# Patient Record
Sex: FEMALE | Race: WHITE
[De-identification: names, ages, dates, MRNs, and addresses within clinical notes are randomized per-mention and may not be internally consistent; named-entity substitution may affect disease eponyms.]

---

## 2019-02-23 ENCOUNTER — HOSPITAL ENCOUNTER (EMERGENCY)
Dept: HOSPITAL 11 - JP.ED | Age: 44
LOS: 1 days | Discharge: HOME | End: 2019-02-24
Payer: COMMERCIAL

## 2019-02-23 DIAGNOSIS — I10: ICD-10-CM

## 2019-02-23 DIAGNOSIS — Z88.8: ICD-10-CM

## 2019-02-23 DIAGNOSIS — Z90.710: ICD-10-CM

## 2019-02-23 DIAGNOSIS — Z90.49: ICD-10-CM

## 2019-02-23 DIAGNOSIS — R10.13: Primary | ICD-10-CM

## 2019-02-23 DIAGNOSIS — Z79.899: ICD-10-CM

## 2019-02-23 PROCEDURE — 83690 ASSAY OF LIPASE: CPT

## 2019-02-23 PROCEDURE — 84484 ASSAY OF TROPONIN QUANT: CPT

## 2019-02-23 PROCEDURE — 80053 COMPREHEN METABOLIC PANEL: CPT

## 2019-02-23 PROCEDURE — 36415 COLL VENOUS BLD VENIPUNCTURE: CPT

## 2019-02-23 PROCEDURE — C9113 INJ PANTOPRAZOLE SODIUM, VIA: HCPCS

## 2019-02-23 PROCEDURE — 82553 CREATINE MB FRACTION: CPT

## 2019-02-23 PROCEDURE — 85025 COMPLETE CBC W/AUTO DIFF WBC: CPT

## 2019-02-23 PROCEDURE — 96361 HYDRATE IV INFUSION ADD-ON: CPT

## 2019-02-23 PROCEDURE — 71045 X-RAY EXAM CHEST 1 VIEW: CPT

## 2019-02-23 PROCEDURE — G0480 DRUG TEST DEF 1-7 CLASSES: HCPCS

## 2019-02-23 PROCEDURE — 99284 EMERGENCY DEPT VISIT MOD MDM: CPT

## 2019-02-23 PROCEDURE — 96374 THER/PROPH/DIAG INJ IV PUSH: CPT

## 2019-02-23 PROCEDURE — 96375 TX/PRO/DX INJ NEW DRUG ADDON: CPT

## 2019-02-23 NOTE — CRLCR
INDICATION:



Chest pain.



TECHNIQUE:



AP portable chest x-ray.



FINDINGS:



The heart size is upper limits of normal. Shallow inspiration. No focal 

infiltrate or consolidation either lung. Minimal elevation right 

hemidiaphragm. Surgical clips right upper abdomen. Mild ectasia ascending 

aorta and aortic arch. Chest otherwise negative without acute disease.



Dictated by Hany Leahy MD @ Feb 23 2019  9:39PM



Signed by Dr. Hany Leahy @ Feb 23 2019  9:39PM

## 2019-02-23 NOTE — EDM.PDOC
ED HPI GENERAL MEDICAL PROBLEM





- General


Chief Complaint: Chest Pain


Stated Complaint: MEDICAL VIA NORTH


Time Seen by Provider: 19 20:26


Source of Information: Reports: Patient, Family, RN Notes Reviewed


History Limitations: Reports: No Limitations





- History of Present Illness


INITIAL COMMENTS - FREE TEXT/NARRATIVE: 





43-year-old female presents emergency department today via EMS services for 

sudden onset epigastric pain, she states she's never had pain like this before 

it is right the center of her chest sharp shooting through to the back she 

states she was so waiting for dinner prior to the onset of pain, has consumed a 

large amount alcohol today, surgical history of cholecystectomy and appendectomy

, cardiovascular history of hypertension, no nausea vomiting no diaphoresis no 

shortness of breath


  ** epigastric pain


Pain Score (Numeric/FACES): 4





- Related Data


 Allergies











Allergy/AdvReac Type Severity Reaction Status Date / Time


 


doxycycline Allergy  Rash Verified 19 20:16


 


nickel Allergy  Rash Verified 19 20:16











Home Meds: 


 Home Meds





Diltiazem HCl [Cartia Xt] 240 mg PO DAILY 19 [History]


Lisinopril/Hydrochlorothiazide [Lisinopril-Hctz 10-12.5 mg Tab] 1 tab PO DAILY 

19 [History]


Omeprazole Magnesium [Prilosec Otc] 20 mg PO DAILY 19 [History]











Past Medical History


Cardiovascular History: Reports: Arrhythmia, Hypertension


Gastrointestinal History: Reports: Other (See Below)


Other Gastrointestinal History: acid reflux


OB/GYN History: Reports: Pregnancy


Musculoskeletal History: Reports: Arthritis, Fracture, Other (See Below)


Other Musculoskeletal History: left foot fx





- Past Surgical History


GI Surgical History: Reports: Appendectomy, Cholecystectomy


Female  Surgical History: Reports:  Section, Hysterectomy


Neurological Surgical History: Reports: Other (See Below)


Other Neurological Surgeries/Procedures: L5-S1 microdiscectomy





Social & Family History





- Tobacco Use


Smoking Status *Q: Never Smoker





- Caffeine Use


Caffeine Use: Reports: Energy Drinks, Soda





- Recreational Drug Use


Recreational Drug Use: No





ED ROS GENERAL





- Review of Systems


Review Of Systems: See Below


Constitutional: Reports: No Symptoms


HEENT: Reports: No Symptoms


Respiratory: Reports: No Symptoms


Cardiovascular: Reports: No Symptoms


GI/Abdominal: Reports: Abdominal Pain (Epigastric pain), Flatus.  Denies: Nausea

, Vomiting


: Reports: No Symptoms


Musculoskeletal: Reports: No Symptoms


Skin: Reports: No Symptoms


Neurological: Reports: No Symptoms





ED EXAM, GENERAL





- Physical Exam


Exam: See Below


Free Text/Narrative:: 





General: Female, moderate discomfort secondary to pain, alert and oriented x3 

HEENT: head is atraumatic normocephalic, eyes pupils equal round reactive to 

light, sclera clear no conjunctivitis appreciated.  Ears tympanic membranes 

clear and gray landmarks and light reflex are present bilaterally canals are 

clear.  Nose no septal deviation, nares are clear, no blood present.  Mouth 

mucosa is moist and pink no erythema or exudate noted in soft palate, tongue is 

midline uvula is midline, dentition is intact.


Neck: Supple no thyromegaly no tracheal deviation.


Nodes: Cervical nodes subclavicular nodes nontender no palpable lymphadenopathy 

noted.


Lungs: clear to auscultation bilaterally with symmetrical respirations, no 

adventitious noise appreciated.


CV: Regular rate and rhythm S1 and S2 appreciated no murmurs rubs or gallops 

noted.


Abdomen: Soft, tender in epigastric region pain is improved with pressure, no 

palpable masses or organomegaly appreciated, no distention no guarding bowel 

sounds are present, 


Neuro: Cranial nerves II through XII intact GCS 15,


Skin: Warm and dry, intact


Extremities: No lower extremity edema appreciated, pedal pulse is +2.





Course





- Vital Signs


Last Recorded V/S: 


 Last Vital Signs











Temp  97.2 F   19 20:27


 


Pulse  81   19 23:00


 


Resp  17   19 23:00


 


BP  109/69   19 23:00


 


Pulse Ox  98   19 23:00














- Orders/Labs/Meds


Orders: 


 Active Orders 24 hr











 Category Date Time Status


 


 Cardiac Monitoring [RC] .As Directed Care  19 20:30 Active


 


 Sodium Chloride 0.9% [Normal Saline] 1,000 ml Med  19 20:45 Active





 IV ASDIRECTED   


 


 Sodium Chloride 0.9% [Normal Saline] 1,000 ml Med  19 22:15 Active





 IV ASDIRECTED   


 


 Sodium Chloride 0.9% [Saline Flush] Med  19 20:30 Active





 10 ml FLUSH ASDIRECTED PRN   


 


 Saline Lock Insert [OM.PC] Stat Oth  19 20:30 Ordered








 Medication Orders





Sodium Chloride (Normal Saline)  1,000 mls @ 999 mls/hr IV ASDIRECTED CESAR


   Last Admin: 19 20:44  Dose: 999 mls/hr


Sodium Chloride (Normal Saline)  1,000 mls @ 500 mls/hr IV ASDIRECTED CESAR


   Last Admin: 19 22:28  Dose: 500 mls/hr


Sodium Chloride (Saline Flush)  10 ml FLUSH ASDIRECTED PRN


   PRN Reason: Keep Vein Open


   Last Admin: 19 20:46  Dose: 10 ml








Labs: 


 Laboratory Tests











  19 Range/Units





  20:42 20:42 20:42 


 


WBC  8.8    (4.5-11.0)  K/uL


 


RBC  4.40    (3.30-5.50)  M/uL


 


Hgb  13.5    (12.0-15.0)  g/dL


 


Hct  39.5    (36.0-48.0)  %


 


MCV  90    (80-98)  fL


 


MCH  31    (27-31)  pg


 


MCHC  34    (32-36)  %


 


Plt Count  339    (150-400)  K/uL


 


Neut % (Auto)  53    (36-66)  %


 


Lymph % (Auto)  37    (24-44)  %


 


Mono % (Auto)  9 H    (2-6)  %


 


Eos % (Auto)  1 L    (2-4)  %


 


Baso % (Auto)  0    (0-1)  %


 


Sodium   142   (140-148)  mmol/L


 


Potassium   3.1 L   (3.6-5.2)  mmol/L


 


Chloride   102   (100-108)  mmol/L


 


Carbon Dioxide   27   (21-32)  mmol/L


 


Anion Gap   16.1 H   (5.0-14.0)  mmol/L


 


BUN   9   (7-18)  mg/dL


 


Creatinine   0.9   (0.6-1.0)  mg/dL


 


Est Cr Clr Drug Dosing   68.16   mL/min


 


Estimated GFR (MDRD)   > 60   (>60)  


 


Glucose   138 H   ()  mg/dL


 


Calcium   9.2   (8.5-10.1)  mg/dL


 


Total Bilirubin   0.3   (0.2-1.0)  mg/dL


 


AST   63 H   (15-37)  U/L


 


ALT   57   (12-78)  U/L


 


Alkaline Phosphatase   88   ()  U/L


 


CK-MB (CK-2)   1.7   (0-3.6)  mg/mL


 


Troponin I   < 0.017   (0.000-0.056)  ng/mL


 


Total Protein   7.4   (6.4-8.2)  g/dL


 


Albumin   3.6   (3.4-5.0)  g/dL


 


Globulin   3.8 H   (2.3-3.5)  g/dL


 


Albumin/Globulin Ratio   1.0 L   (1.2-2.2)  


 


Lipase   329   ()  U/L


 


Ethyl Alcohol    216  mg/dL














  19 Range/Units





  22:28 


 


WBC   (4.5-11.0)  K/uL


 


RBC   (3.30-5.50)  M/uL


 


Hgb   (12.0-15.0)  g/dL


 


Hct   (36.0-48.0)  %


 


MCV   (80-98)  fL


 


MCH   (27-31)  pg


 


MCHC   (32-36)  %


 


Plt Count   (150-400)  K/uL


 


Neut % (Auto)   (36-66)  %


 


Lymph % (Auto)   (24-44)  %


 


Mono % (Auto)   (2-6)  %


 


Eos % (Auto)   (2-4)  %


 


Baso % (Auto)   (0-1)  %


 


Sodium   (140-148)  mmol/L


 


Potassium   (3.6-5.2)  mmol/L


 


Chloride   (100-108)  mmol/L


 


Carbon Dioxide   (21-32)  mmol/L


 


Anion Gap   (5.0-14.0)  mmol/L


 


BUN   (7-18)  mg/dL


 


Creatinine   (0.6-1.0)  mg/dL


 


Est Cr Clr Drug Dosing   mL/min


 


Estimated GFR (MDRD)   (>60)  


 


Glucose   ()  mg/dL


 


Calcium   (8.5-10.1)  mg/dL


 


Total Bilirubin   (0.2-1.0)  mg/dL


 


AST   (15-37)  U/L


 


ALT   (12-78)  U/L


 


Alkaline Phosphatase   ()  U/L


 


CK-MB (CK-2)   (0-3.6)  mg/mL


 


Troponin I  < 0.017  (0.000-0.056)  ng/mL


 


Total Protein   (6.4-8.2)  g/dL


 


Albumin   (3.4-5.0)  g/dL


 


Globulin   (2.3-3.5)  g/dL


 


Albumin/Globulin Ratio   (1.2-2.2)  


 


Lipase   ()  U/L


 


Ethyl Alcohol   mg/dL











Meds: 


Medications











Generic Name Dose Route Start Last Admin





  Trade Name Freq  PRN Reason Stop Dose Admin


 


Sodium Chloride  1,000 mls @ 999 mls/hr  19 20:45  19 20:44





  Normal Saline  IV   999 mls/hr





  ASDIRECTED CESAR   Administration





     





     





     





     


 


Sodium Chloride  1,000 mls @ 500 mls/hr  19 22:15  19 22:28





  Normal Saline  IV   500 mls/hr





  ASDIRECTED CESAR   Administration





     





     





     





     


 


Sodium Chloride  10 ml  19 20:30  19 20:46





  Saline Flush  FLUSH   10 ml





  ASDIRECTED PRN   Administration





  Keep Vein Open   





     





     





     














Discontinued Medications














Generic Name Dose Route Start Last Admin





  Trade Name Freq  PRN Reason Stop Dose Admin


 


Al Hydroxide/Mg Hydroxide 15  0 ml  19 20:37  19 20:44





  ml/ Lidocaine HCl 15 ml  PO  19 20:38  30 ml





  ONETIME ONE   Administration





     





     





     





     


 


Lorazepam  1 mg  19 20:59  19 21:08





  Ativan  IVPUSH  19 21:00  1 mg





  ONETIME ONE   Administration





     





     





     





     


 


Morphine Sulfate  1 mg  19 20:56  19 23:16





  Morphine  IVPUSH  19 20:57  1 mg





  ONETIME ONE   Administration





     





     





     





     


 


Pantoprazole Sodium  40 mg  19 22:15  19 22:22





  Protonix Iv***  IVPUSH  19 22:16  40 mg





  ONETIME ONE   Administration





     





     





     





     














Departure





- Departure


Time of Disposition: 23:39


Disposition: Home, Self-Care 01


Condition: Fair


Clinical Impression: 


 Epigastric abdominal pain





Referrals: 


PCP,None [Primary Care Provider] - 


Forms:  ED Department Discharge


Additional Instructions: 


Use Percocet as needed for pain control, please follow-up with your primary 

care provider upon return home, recommend increasing your omeprazole from 20 mg 

a day to 20 mg twice a day, call return to the emergency department worsening 

of symptoms





- My Orders


Last 24 Hours: 


My Active Orders





19 20:30


Cardiac Monitoring [RC] .As Directed 


Sodium Chloride 0.9% [Saline Flush]   10 ml FLUSH ASDIRECTED PRN 


Saline Lock Insert [OM.PC] Stat 





19 20:45


Sodium Chloride 0.9% [Normal Saline] 1,000 ml IV ASDIRECTED 





19 22:15


Sodium Chloride 0.9% [Normal Saline] 1,000 ml IV ASDIRECTED 














- Assessment/Plan


Last 24 Hours: 


My Active Orders





19 20:30


Cardiac Monitoring [RC] .As Directed 


Sodium Chloride 0.9% [Saline Flush]   10 ml FLUSH ASDIRECTED PRN 


Saline Lock Insert [OM.PC] Stat 





19 20:45


Sodium Chloride 0.9% [Normal Saline] 1,000 ml IV ASDIRECTED 





19 22:15


Sodium Chloride 0.9% [Normal Saline] 1,000 ml IV ASDIRECTED 











Plan: 





Assessment





Acuity = acute





Site and laterality = epigastric abdominal pain  





Etiology  = suspicious for gastroesophageal reflux disease  





Manifestations = none  





Location of injury =  Home





Lab values = CBC, CMP, troponin negative 2 EKG demonstrates normal sinus 

rhythm chest x-ray shows no acute process  





Plan


I did review lab work EKG chest x-ray results with her she did get some relief 

from Protonix IV as well as morphine, no relief from nitroglycerin no relief 

from GI cocktail plan is discharge home prescription written for Percocet 5/325 

one tab by mouth 3 times a day total #10 she will follow-up with her primary 

care upon return home  

















 This note was dictated using dragon voice recognition software please call 

with any questions on syntax or grammar.